# Patient Record
Sex: MALE | Race: WHITE | NOT HISPANIC OR LATINO
[De-identification: names, ages, dates, MRNs, and addresses within clinical notes are randomized per-mention and may not be internally consistent; named-entity substitution may affect disease eponyms.]

---

## 2018-08-21 ENCOUNTER — APPOINTMENT (OUTPATIENT)
Dept: HEART AND VASCULAR | Facility: CLINIC | Age: 68
End: 2018-08-21
Payer: MEDICARE

## 2018-08-21 VITALS
RESPIRATION RATE: 15 BRPM | DIASTOLIC BLOOD PRESSURE: 70 MMHG | HEART RATE: 60 BPM | BODY MASS INDEX: 25.16 KG/M2 | SYSTOLIC BLOOD PRESSURE: 114 MMHG | WEIGHT: 166 LBS | HEIGHT: 68 IN

## 2018-08-21 DIAGNOSIS — Z86.010 PERSONAL HISTORY OF COLONIC POLYPS: ICD-10-CM

## 2018-08-21 DIAGNOSIS — Z87.11 PERSONAL HISTORY OF PEPTIC ULCER DISEASE: ICD-10-CM

## 2018-08-21 DIAGNOSIS — Z78.9 OTHER SPECIFIED HEALTH STATUS: ICD-10-CM

## 2018-08-21 DIAGNOSIS — F17.200 NICOTINE DEPENDENCE, UNSPECIFIED, UNCOMPLICATED: ICD-10-CM

## 2018-08-21 DIAGNOSIS — Z00.00 ENCOUNTER FOR GENERAL ADULT MEDICAL EXAMINATION W/OUT ABNORMAL FINDINGS: ICD-10-CM

## 2018-08-21 DIAGNOSIS — E78.5 HYPERLIPIDEMIA, UNSPECIFIED: ICD-10-CM

## 2018-08-21 DIAGNOSIS — Z87.19 PERSONAL HISTORY OF OTHER DISEASES OF THE DIGESTIVE SYSTEM: ICD-10-CM

## 2018-08-21 DIAGNOSIS — Z82.49 FAMILY HISTORY OF ISCHEMIC HEART DISEASE AND OTHER DISEASES OF THE CIRCULATORY SYSTEM: ICD-10-CM

## 2018-08-21 DIAGNOSIS — J44.9 CHRONIC OBSTRUCTIVE PULMONARY DISEASE, UNSPECIFIED: ICD-10-CM

## 2018-08-21 DIAGNOSIS — E55.9 VITAMIN D DEFICIENCY, UNSPECIFIED: ICD-10-CM

## 2018-08-21 DIAGNOSIS — I70.8 ATHEROSCLEROSIS OF OTHER ARTERIES: ICD-10-CM

## 2018-08-21 DIAGNOSIS — I34.0 NONRHEUMATIC MITRAL (VALVE) INSUFFICIENCY: ICD-10-CM

## 2018-08-21 DIAGNOSIS — Z95.1 PRESENCE OF AORTOCORONARY BYPASS GRAFT: ICD-10-CM

## 2018-08-21 DIAGNOSIS — E53.8 DEFICIENCY OF OTHER SPECIFIED B GROUP VITAMINS: ICD-10-CM

## 2018-08-21 DIAGNOSIS — I25.10 ATHEROSCLEROTIC HEART DISEASE OF NATIVE CORONARY ARTERY W/OUT ANGINA PECTORIS: ICD-10-CM

## 2018-08-21 PROCEDURE — 99214 OFFICE O/P EST MOD 30 MIN: CPT | Mod: 25

## 2018-08-21 PROCEDURE — 93000 ELECTROCARDIOGRAM COMPLETE: CPT

## 2018-08-21 PROCEDURE — 36415 COLL VENOUS BLD VENIPUNCTURE: CPT

## 2018-08-21 RX ORDER — ASPIRIN 81 MG
81 TABLET, DELAYED RELEASE (ENTERIC COATED) ORAL
Refills: 0 | Status: ACTIVE | COMMUNITY

## 2018-08-21 RX ORDER — DEXLANSOPRAZOLE 60 MG/1
60 CAPSULE, DELAYED RELEASE ORAL
Refills: 0 | Status: ACTIVE | COMMUNITY

## 2018-08-21 RX ORDER — COLD-HOT PACK
125 MCG EACH MISCELLANEOUS
Refills: 0 | Status: ACTIVE | COMMUNITY

## 2018-08-21 RX ORDER — CYANOCOBALAMIN (VITAMIN B-12) 1000 MCG
1000 TABLET ORAL
Refills: 0 | Status: ACTIVE | COMMUNITY

## 2018-08-21 RX ORDER — PROPRANOLOL HYDROCHLORIDE 160 MG/1
160 CAPSULE, EXTENDED RELEASE ORAL
Refills: 0 | Status: ACTIVE | COMMUNITY

## 2018-08-21 RX ORDER — PRIMIDONE 50 MG/1
50 TABLET ORAL
Refills: 0 | Status: ACTIVE | COMMUNITY

## 2018-08-21 RX ORDER — TAMSULOSIN HYDROCHLORIDE 0.4 MG/1
0.4 CAPSULE ORAL
Refills: 0 | Status: ACTIVE | COMMUNITY

## 2018-08-22 LAB
ALBUMIN SERPL ELPH-MCNC: 4.8 G/DL
ALP BLD-CCNC: 108 U/L
ALT SERPL-CCNC: 15 U/L
ANION GAP SERPL CALC-SCNC: 15 MMOL/L
AST SERPL-CCNC: 21 U/L
BASOPHILS # BLD AUTO: 0.05 K/UL
BASOPHILS NFR BLD AUTO: 0.5 %
BILIRUB SERPL-MCNC: 0.2 MG/DL
BUN SERPL-MCNC: 10 MG/DL
CALCIUM SERPL-MCNC: 10.1 MG/DL
CHLORIDE SERPL-SCNC: 100 MMOL/L
CHOLEST SERPL-MCNC: 167 MG/DL
CHOLEST/HDLC SERPL: 4.5 RATIO
CO2 SERPL-SCNC: 27 MMOL/L
CREAT SERPL-MCNC: 1.1 MG/DL
EOSINOPHIL # BLD AUTO: 0.31 K/UL
EOSINOPHIL NFR BLD AUTO: 2.8 %
GLUCOSE SERPL-MCNC: 85 MG/DL
HBA1C MFR BLD HPLC: 6 %
HCT VFR BLD CALC: 44.5 %
HDLC SERPL-MCNC: 37 MG/DL
HGB BLD-MCNC: 14.3 G/DL
IMM GRANULOCYTES NFR BLD AUTO: 0.2 %
LDLC SERPL CALC-MCNC: 90 MG/DL
LYMPHOCYTES # BLD AUTO: 2.56 K/UL
LYMPHOCYTES NFR BLD AUTO: 23.5 %
MAN DIFF?: NORMAL
MCHC RBC-ENTMCNC: 32.1 GM/DL
MCHC RBC-ENTMCNC: 33 PG
MCV RBC AUTO: 102.8 FL
MONOCYTES # BLD AUTO: 0.7 K/UL
MONOCYTES NFR BLD AUTO: 6.4 %
NEUTROPHILS # BLD AUTO: 7.27 K/UL
NEUTROPHILS NFR BLD AUTO: 66.6 %
PLATELET # BLD AUTO: 301 K/UL
POTASSIUM SERPL-SCNC: 4.9 MMOL/L
PROT SERPL-MCNC: 7 G/DL
RBC # BLD: 4.33 M/UL
RBC # FLD: 14.8 %
SODIUM SERPL-SCNC: 142 MMOL/L
T3FREE SERPL-MCNC: 4.25 PG/ML
T4 FREE SERPL-MCNC: 1.3 NG/DL
T4 SERPL-MCNC: 8.4 UG/DL
TRIGL SERPL-MCNC: 199 MG/DL
TSH SERPL-ACNC: 2.4 UIU/ML
WBC # FLD AUTO: 10.91 K/UL

## 2018-12-26 ENCOUNTER — APPOINTMENT (OUTPATIENT)
Dept: HEART AND VASCULAR | Facility: CLINIC | Age: 68
End: 2018-12-26

## 2023-06-20 ENCOUNTER — NON-APPOINTMENT (OUTPATIENT)
Age: 73
End: 2023-06-20

## 2023-06-20 ENCOUNTER — APPOINTMENT (OUTPATIENT)
Dept: NEUROSURGERY | Facility: CLINIC | Age: 73
End: 2023-06-20
Payer: MEDICARE

## 2023-06-20 VITALS
HEIGHT: 68 IN | SYSTOLIC BLOOD PRESSURE: 121 MMHG | BODY MASS INDEX: 25.76 KG/M2 | WEIGHT: 170 LBS | DIASTOLIC BLOOD PRESSURE: 68 MMHG | HEART RATE: 48 BPM | OXYGEN SATURATION: 97 %

## 2023-06-20 DIAGNOSIS — G25.0 ESSENTIAL TREMOR: ICD-10-CM

## 2023-06-20 PROCEDURE — 99205 OFFICE O/P NEW HI 60 MIN: CPT

## 2023-06-20 RX ORDER — ATORVASTATIN CALCIUM 80 MG/1
80 TABLET, FILM COATED ORAL
Refills: 0 | Status: ACTIVE | COMMUNITY

## 2023-06-20 RX ORDER — APIXABAN 5 MG/1
5 TABLET, FILM COATED ORAL
Refills: 0 | Status: ACTIVE | COMMUNITY

## 2023-06-20 RX ORDER — BUPROPION HYDROCHLORIDE 100 MG/1
100 TABLET, FILM COATED ORAL
Refills: 0 | Status: ACTIVE | COMMUNITY

## 2023-06-20 RX ORDER — TERAZOSIN HCL 2 MG
2 TABLET ORAL
Refills: 0 | Status: ACTIVE | COMMUNITY

## 2023-06-20 RX ORDER — CLOPIDOGREL BISULFATE 75 MG/1
75 TABLET, FILM COATED ORAL
Refills: 0 | Status: ACTIVE | COMMUNITY

## 2023-06-20 RX ORDER — EZETIMIBE 10 MG/1
10 TABLET ORAL
Refills: 0 | Status: ACTIVE | COMMUNITY

## 2023-06-20 RX ORDER — CHROMIUM 200 MCG
TABLET ORAL
Refills: 0 | Status: ACTIVE | COMMUNITY

## 2023-06-23 NOTE — HISTORY OF PRESENT ILLNESS
[> 3 months] : more  than 3 months [FreeTextEntry1] : tremor [de-identified] : MANDY EASTMAN is a 72 year old right handed male with PMH of essential tremor, HTN, gastritis, peptic ulcer, CAD s/p triple bypass 2005 with possible? 1 stent, right carotid stenosis, atrial fibrillation (on Eliquis), Raynaud's disease. He also takes Plavix. He presents for neurosurgical consult for high intensity focused ultrasound. He does not have a neurologist currently. He was diagnosed with essential tremor ~10 years ago. Tremor started before then in right hand. Now he notices a little tremor in left hand, and head tremor that started about 1 year ago. No jaw or voice tremor. He was started on Primidone when diagnosed and it didn't help much, but he still takes 150 mg BID. He also takes propranolol 80 mg BID currently. Tremor improves with alcohol. He has family history of tremor in mother, brother, and 2 children. Difficulty with ADLs like writing, eating (which didn't improve with weighted utensils), drinking, with buttons.  States balance is good, denies falls.

## 2023-06-23 NOTE — ASSESSMENT
[FreeTextEntry1] : IMPRESSION:\par 72M with PMH of severe ET, HTN, GERD, gastritis, peptic ulcer, CAD s/p triple bypass 2005 with ?1 stent, right carotid stenosis, a-fib, Reynaud's disease. He is on Eliquis and Plavix. He presents for HIFU consult. He was dx with ET 10 years ago. Had right hand tremor which has progressed to left hand and head. He is on primidone and propranolol. It is affecting ability to perform ADLs like writing, eating, drinking. \par \par Pt not interested in deep brain stimulation. Discussed MRI guided focused ultrasound therapy including the procedure, risks, and benefits in office today. \par \par \par During MRI guided focused ultrasound, a lesion is made in brain that is pathologically related to cause of tremor. If pathological track is targeted by creating lesion/burn in area along white matter tract, it will also treat tremor. Ventral intermediate nucleus of the thalamus is area that is targeted during procedure.\par \par High intensity focused ultrasound is a procedure that occurs in an MRI magnet, where a series of MRIs are performed and the target is identified and ultrasound therapy is targeted at identified area. The head is completely shaved prior to a frame being placed. Then a ‘rubber bag’ of circulating cool water is fitted to the head to prevent thermal injury to the scalp, and improve the interface between the transducer and the scalp. Subthreshold sonications are delivered so benefit and side effect can be assessed prior to creating a permanent lesion. Many series of assessments will be performed during the procedure. Lesions are irreversible. FDA approved for unilateral treatment. If this option chosen, left VIM thalamus would be targeted to treat right hand tremor. It will not improve head tremor as b/l treatment needed, or left hand tremor. The other side can be treated 9-12 months later if the patient desires. This is procedure, not a surgery, not performed under anesthesia.\par \par Side effects are possible including tingling around face on one side or around lips or hand. That may persist up to several months after procedure, and can persist. May be faint to severe. May attenuate over a year following procedure. Weakness possible in addition to paraesthesias. Side effect may sophia over time. Very common to have unsteady gait after procedure. You will be discharged with a walker as a fall precaution. Efficacy of tremor suppression wanes after 1 year and attenuates over time. May need another procedure in the future.\par \par \par PLAN:\par Referral to movement disorder neurologist for tremor grading. \par CT head HIFU protocol to be done at [facility] to determine skull density ratio (SDR)\par Asymptomatic bradycardia- on propranolol BID, has appointment with cardiology next week\par *must find out if stent is 3T MRI compatible\par Would need to stop Eliquis and Plavix prior to procedure\par \par \par I, Dr. Ameya Saldana, personally performed the evaluation and management (E/M) services for this new patient.  That E/M includes conducting the clinically appropriate initial history &/or exam, assessing all conditions, and establishing the plan of care.  Today, my ANKIT, [employer], was here to observe my evaluation and management service for this patient & follow plan of care established by me going forward.\par

## 2023-06-23 NOTE — PHYSICAL EXAM
[General Appearance - Alert] : alert [General Appearance - In No Acute Distress] : in no acute distress [General Appearance - Well Nourished] : well nourished [General Appearance - Well-Appearing] : healthy appearing [Oriented To Time, Place, And Person] : oriented to person, place, and time [Affect] : the affect was normal [Mood] : the mood was normal [Person] : oriented to person [Place] : oriented to place [Time] : oriented to time [Cranial Nerves Oculomotor (III)] : extraocular motion intact [Cranial Nerves Trigeminal (V)] : facial sensation intact symmetrically [Cranial Nerves Facial (VII)] : face symmetrical [Cranial Nerves Vestibulocochlear (VIII)] : hearing was intact bilaterally [Cranial Nerves Accessory (XI - Cranial And Spinal)] : head turning and shoulder shrug symmetric [Cranial Nerves Hypoglossal (XII)] : there was no tongue deviation with protrusion [Motor Strength] : muscle strength was normal in all four extremities [Motor Handedness Right-Handed] : the patient is right hand dominant [Sensation Tactile Decrease] : light touch was intact [Tremor] : a tremor present [] : no respiratory distress [Respiration, Rhythm And Depth] : normal respiratory rhythm and effort [FreeTextEntry8] : mild right rest tremor, postural with arms extended: 1 cm b/l, winged: 1 cm b/l, finger to nose: 3-5 cm b/l, tremor severe with spiral, tremor exacerbated holding mug and bringing to mouth b/l, unable to tandem gait or stance, able to stand with feet together

## 2023-07-06 ENCOUNTER — APPOINTMENT (OUTPATIENT)
Dept: NEUROLOGY | Facility: CLINIC | Age: 73
End: 2023-07-06

## 2024-03-29 ENCOUNTER — OUTPATIENT (OUTPATIENT)
Dept: OUTPATIENT SERVICES | Facility: HOSPITAL | Age: 74
LOS: 1 days | End: 2024-03-29
Payer: MEDICARE

## 2024-03-29 ENCOUNTER — APPOINTMENT (OUTPATIENT)
Dept: CT IMAGING | Facility: HOSPITAL | Age: 74
End: 2024-03-29

## 2024-03-29 DIAGNOSIS — G25.0 ESSENTIAL TREMOR: ICD-10-CM

## 2024-03-29 PROCEDURE — 70450 CT HEAD/BRAIN W/O DYE: CPT | Mod: 26

## 2024-03-29 PROCEDURE — 70450 CT HEAD/BRAIN W/O DYE: CPT

## 2024-03-31 ENCOUNTER — NON-APPOINTMENT (OUTPATIENT)
Age: 74
End: 2024-03-31

## 2024-04-03 ENCOUNTER — NON-APPOINTMENT (OUTPATIENT)
Age: 74
End: 2024-04-03

## 2024-04-05 ENCOUNTER — NON-APPOINTMENT (OUTPATIENT)
Age: 74
End: 2024-04-05

## 2024-05-30 ENCOUNTER — APPOINTMENT (OUTPATIENT)
Dept: NEUROLOGY | Facility: CLINIC | Age: 74
End: 2024-05-30
Payer: MEDICARE

## 2024-05-30 VITALS
SYSTOLIC BLOOD PRESSURE: 129 MMHG | WEIGHT: 170 LBS | HEIGHT: 68 IN | BODY MASS INDEX: 25.76 KG/M2 | DIASTOLIC BLOOD PRESSURE: 71 MMHG | HEART RATE: 70 BPM

## 2024-05-30 PROCEDURE — 99205 OFFICE O/P NEW HI 60 MIN: CPT

## 2024-05-31 ENCOUNTER — RESULT REVIEW (OUTPATIENT)
Age: 74
End: 2024-05-31

## 2024-05-31 NOTE — HISTORY OF PRESENT ILLNESS
[FreeTextEntry1] : 73-year-old male with reported history of ET with hand tremors. He was last seen by a neurologist 3 years ago. Patient has been interested in HIFU for the last 6 years. Patient is electing to undergo HIFU to treat his RUE.

## 2024-05-31 NOTE — PHYSICAL EXAM
[General Appearance - Alert] : alert [Oriented To Time, Place, And Person] : oriented to person, place, and time [Cranial Nerves Oculomotor (III)] : extraocular motion intact [FreeTextEntry1] : 0 facial masking Vertical eye movements intact without square wave jerks normal speech 0 Rigidity of neck rotation 0 Rigidity of limbs present with contralateral activation  +1 RUE Resting tremor Moderate right greater than left action tremor Moderate right greater than left postural tremor  0 Bradykinesia with finger tapping, hand supination/pronation, foot tapping, foot stomping. No dysmetria with finger to nose Gait: able to stand with hands crossed and without assistance posture WNL Gait WNL. Unable to tandem.  Negative retropulsion test Handwriting illegible due to tremor severity. Spirals have severe waveforms   ANNE 1,2 & 3 score: 3 Tetras ADL subscale: 34/48 Tetras performance subscale: 35/64

## 2024-05-31 NOTE — DISCUSSION/SUMMARY
[FreeTextEntry1] : 73-year-old male with reported history of ET with hand tremors completed tremor assessment. He is electing to undergo HIFU to treat his RUE since his tremors interfere with many activities of daily living. Procedure is scheduled for June 20th 2024.  Patient was counseled on the following recommendations Return 3 months post procedure to complete reassessment.

## 2024-06-06 ENCOUNTER — APPOINTMENT (OUTPATIENT)
Dept: MRI IMAGING | Facility: HOSPITAL | Age: 74
End: 2024-06-06

## 2024-06-06 ENCOUNTER — OUTPATIENT (OUTPATIENT)
Dept: OUTPATIENT SERVICES | Facility: HOSPITAL | Age: 74
LOS: 1 days | End: 2024-06-06
Payer: MEDICARE

## 2024-06-06 DIAGNOSIS — G25.0 ESSENTIAL TREMOR: ICD-10-CM

## 2024-06-06 DIAGNOSIS — Z00.00 ENCOUNTER FOR GENERAL ADULT MEDICAL EXAMINATION WITHOUT ABNORMAL FINDINGS: ICD-10-CM

## 2024-06-06 PROCEDURE — 70551 MRI BRAIN STEM W/O DYE: CPT

## 2024-06-06 PROCEDURE — 70551 MRI BRAIN STEM W/O DYE: CPT | Mod: 26

## 2024-06-09 ENCOUNTER — NON-APPOINTMENT (OUTPATIENT)
Age: 74
End: 2024-06-09

## 2024-06-13 ENCOUNTER — NON-APPOINTMENT (OUTPATIENT)
Age: 74
End: 2024-06-13

## 2024-06-18 ENCOUNTER — NON-APPOINTMENT (OUTPATIENT)
Age: 74
End: 2024-06-18

## 2024-06-19 ENCOUNTER — TRANSCRIPTION ENCOUNTER (OUTPATIENT)
Age: 74
End: 2024-06-19

## 2024-06-20 ENCOUNTER — APPOINTMENT (OUTPATIENT)
Dept: MRI IMAGING | Facility: HOSPITAL | Age: 74
End: 2024-06-20

## 2024-06-20 ENCOUNTER — TRANSCRIPTION ENCOUNTER (OUTPATIENT)
Age: 74
End: 2024-06-20

## 2024-06-20 ENCOUNTER — APPOINTMENT (OUTPATIENT)
Dept: NEUROSURGERY | Facility: HOSPITAL | Age: 74
End: 2024-06-20

## 2024-06-20 ENCOUNTER — OUTPATIENT (OUTPATIENT)
Dept: OUTPATIENT SERVICES | Facility: HOSPITAL | Age: 74
LOS: 1 days | End: 2024-06-20
Payer: MEDICARE

## 2024-06-20 VITALS
RESPIRATION RATE: 18 BRPM | OXYGEN SATURATION: 95 % | SYSTOLIC BLOOD PRESSURE: 168 MMHG | DIASTOLIC BLOOD PRESSURE: 91 MMHG | HEART RATE: 61 BPM

## 2024-06-20 VITALS — HEIGHT: 68 IN | WEIGHT: 173.06 LBS

## 2024-06-20 DIAGNOSIS — Z00.00 ENCOUNTER FOR GENERAL ADULT MEDICAL EXAMINATION WITHOUT ABNORMAL FINDINGS: ICD-10-CM

## 2024-06-20 PROCEDURE — 0398T: CPT | Mod: 26

## 2024-06-20 PROCEDURE — 61715 MRGFUS STRTCTC ABLT TRGT ICR: CPT

## 2024-06-20 RX ORDER — ONDANSETRON HYDROCHLORIDE 2 MG/ML
8 INJECTION INTRAMUSCULAR; INTRAVENOUS ONCE
Refills: 0 | Status: COMPLETED | OUTPATIENT
Start: 2024-06-20 | End: 2024-06-20

## 2024-06-20 RX ORDER — PANTOPRAZOLE 40 MG/1
40 TABLET, DELAYED RELEASE ORAL
Qty: 7 | Refills: 0 | Status: ACTIVE | COMMUNITY
Start: 2024-06-20 | End: 1900-01-01

## 2024-06-20 RX ORDER — DEXAMETHASONE 2 MG/1
2 TABLET ORAL
Qty: 30 | Refills: 0 | Status: ACTIVE | COMMUNITY
Start: 2024-06-20 | End: 1900-01-01

## 2024-06-20 RX ORDER — ONDANSETRON 8 MG/1
8 TABLET, FILM COATED ORAL ONCE
Refills: 0 | Status: DISCONTINUED | OUTPATIENT
Start: 2024-06-20 | End: 2024-06-20

## 2024-06-20 RX ORDER — ACETAMINOPHEN 500 MG
1000 TABLET ORAL ONCE
Refills: 0 | Status: DISCONTINUED | OUTPATIENT
Start: 2024-06-20 | End: 2024-06-20

## 2024-06-20 RX ORDER — DEXAMETHASONE 0.5 MG/5ML
10 ELIXIR ORAL ONCE
Refills: 0 | Status: DISCONTINUED | OUTPATIENT
Start: 2024-06-20 | End: 2024-06-20

## 2024-06-20 RX ORDER — DEXAMETHASONE 1 MG/1
10 TABLET ORAL ONCE
Refills: 0 | Status: COMPLETED | OUTPATIENT
Start: 2024-06-20 | End: 2024-06-20

## 2024-06-20 RX ORDER — ACETAMINOPHEN 325 MG
1000 TABLET ORAL ONCE
Refills: 0 | Status: COMPLETED | OUTPATIENT
Start: 2024-06-20 | End: 2024-06-20

## 2024-06-20 RX ADMIN — ONDANSETRON HYDROCHLORIDE 8 MILLIGRAM(S): 2 INJECTION INTRAMUSCULAR; INTRAVENOUS at 08:00

## 2024-06-20 RX ADMIN — DEXAMETHASONE 10 MILLIGRAM(S): 1 TABLET ORAL at 08:00

## 2024-06-20 RX ADMIN — Medication 400 MILLIGRAM(S): at 08:00

## 2024-06-20 NOTE — H&P ADULT - ASSESSMENT
72M with PMH of severe ET, HTN, GERD, gastritis, peptic ulcer, CAD s/p triple bypass 2005 with ?1 stent, right carotid stenosis, a-fib, Reynaud's disease. He is on Eliquis and Plavix. He presents for HIFU consult. He was dx with ET 10 years ago. Had right hand tremor which has progressed to left hand and head. He is on primidone and propranolol. It is affecting ability to perform ADLs like writing, eating, drinking. Pt not interested in deep brain stimulation. Discussed MRI guided focused ultrasound therapy including the procedure, risks, and benefits in office. Presents today with HIFU  -Zofran, IV tyl, Dex 10, and d/c home on dex taper  -HIFU today   -Post HIFU MRI

## 2024-06-20 NOTE — H&P ADULT - HISTORY OF PRESENT ILLNESS
72M with PMH of severe ET, HTN, GERD, gastritis, peptic ulcer, CAD s/p triple bypass 2005 with ?1 stent, right carotid stenosis, a-fib, Reynaud's disease. He is on Eliquis and Plavix. He presents for HIFU consult. He was dx with ET 10 years ago. Had right hand tremor which has progressed to left hand and head. He is on primidone and propranolol. It is affecting ability to perform ADLs like writing, eating, drinking.   Pt not interested in deep brain stimulation. Discussed MRI guided focused ultrasound therapy including the procedure, risks, and benefits in office today.

## 2024-07-23 ENCOUNTER — APPOINTMENT (OUTPATIENT)
Dept: NEUROSURGERY | Facility: CLINIC | Age: 74
End: 2024-07-23
Payer: MEDICARE

## 2024-07-23 VITALS
OXYGEN SATURATION: 94 % | DIASTOLIC BLOOD PRESSURE: 59 MMHG | SYSTOLIC BLOOD PRESSURE: 107 MMHG | BODY MASS INDEX: 25.76 KG/M2 | HEART RATE: 64 BPM | HEIGHT: 68 IN | WEIGHT: 170 LBS

## 2024-07-23 DIAGNOSIS — G25.0 ESSENTIAL TREMOR: ICD-10-CM

## 2024-07-23 PROCEDURE — 99214 OFFICE O/P EST MOD 30 MIN: CPT

## 2024-07-28 NOTE — ASSESSMENT
[FreeTextEntry1] : IMPRESSION: 73M with PMH of severe ET, HTN, GERD, gastritis, peptic ulcer, CAD s/p triple bypass 2005 with ?1 stent, right carotid stenosis, a-fib, Reynaud's disease. He is on Eliquis and Plavix. He presents for HIFU consult. He was dx with ET 10 years ago. Had right hand tremor which has progressed to left hand and head. He is on primidone and propranolol. It is affecting ability to perform ADLs like writing, eating, drinking. He is s/p HIFU left VIM thalamus 6/20/24.    He is here for 3 month follow up. Significant improvement in right hand tremor. Had post procedure side effects of gait instability, slurred speech, numbness at tip of tongue. Side effects should improve as swelling resolves. Gait has improved greatly, and slurred speech has improved a little. It is intermittent.  Will continue to monitor memory complaint at 3 month follow up. May not be r/t procedure.   He can discuss tremor  medication with neurology at follow up.   PLAN: -3 months post procedure MRI at [facility] -3 month post procedure repeat tremor grading with neurology -Return to office 2 months

## 2024-07-28 NOTE — HISTORY OF PRESENT ILLNESS
[> 3 months] : more  than 3 months [FreeTextEntry1] : tremor [de-identified] : MANDY EASTMAN is a 73 year old right handed male with PMH of essential tremor, HTN, gastritis, peptic ulcer, CAD s/p triple bypass 2005 with possible? 1 stent, right carotid stenosis, atrial fibrillation (on Eliquis), Raynaud's disease. He also takes Plavix. He presents for neurosurgical consult for high intensity focused ultrasound. He does not have a neurologist currently. He was diagnosed with essential tremor ~10 years ago. Tremor started before then in right hand. Now he notices a little tremor in left hand, and head tremor that started about 1 year ago. No jaw or voice tremor. He was started on Primidone when diagnosed and it didn't help much, but he still takes 150 mg BID. He also takes propranolol 80 mg BID currently. Tremor improves with alcohol. He has family history of tremor in mother, brother, and 2 children. Difficulty with ADLs like writing, eating (which didn't improve with weighted utensils), drinking, with buttons.  States balance is good, denies falls. He underwent HIFU left VIM thalamus for right hand tremor 6/20/24.  Today he presents for 1 month follow up. He has significant right hand tremor improvement with persistent left postural/action tremor.   His gait has significantly improved since immediately post procedure although he is still unsteady at times. He denies numbness/tingling in hands or fingertips or around his mouth. He has some numbness in the tip of his tongue. He has slurred speech which is better since immediately post procedure but it is intermittent. His family reports he is more forgetful, not remembering things they have talked about.

## 2024-07-28 NOTE — ASSESSMENT
[FreeTextEntry1] : IMPRESSION: 73M with PMH of severe ET, HTN, GERD, gastritis, peptic ulcer, CAD s/p triple bypass 2005 with ?1 stent, right carotid stenosis, a-fib, Reynaud's disease. He is on Eliquis and Plavix. He presents for HIFU consult. He was dx with ET 10 years ago. Had right hand tremor which has progressed to left hand and head. He is on primidone and propranolol. It is affecting ability to perform ADLs like writing, eating, drinking. He is s/p HIFU left VIM thalamus 6/20/24.    He is here for 3 month follow up. Significant improvement in right hand tremor. Had post procedure side effects of gait instability, slurred speech, numbness at tip of tongue. Side effects should improve as swelling resolves. Gait has improved greatly, and slurred speech has improved a little. It is intermittent.  Will continue to monitor memory complaint at 3 month follow up. May not be r/t procedure.   He can discuss tremor  medication with neurology at follow up.   PLAN: -3 months post procedure MRI at [facility] -3 month post procedure repeat tremor grading with neurology -Return to office 2 months

## 2024-07-28 NOTE — ASSESSMENT
[FreeTextEntry1] : IMPRESSION: 73M with PMH of severe ET, HTN, GERD, gastritis, peptic ulcer, CAD s/p triple bypass 2005 with ?1 stent, right carotid stenosis, a-fib, Reynaud's disease. He is on Eliquis and Plavix. He presents for HIFU consult. He was dx with ET 10 years ago. Had right hand tremor which has progressed to left hand and head. He is on primidone and propranolol. It is affecting ability to perform ADLs like writing, eating, drinking. He is s/p HIFU left VIM thalamus 6/20/24.    He is here for 3 month follow up. Significant improvement in right hand tremor. Had post procedure side effects of gait instability, slurred speech, numbness at tip of tongue. Side effects should improve as swelling resolves. Gait has improved greatly, and slurred speech has improved a little. It is intermittent.  Will continue to monitor memory complaint at 3 month follow up. May not be r/t procedure.   He can discuss tremor  medication with neurology at follow up.   PLAN: -3 months post procedure MRI at [facility] -3 month post procedure repeat tremor grading with neurology -Return to office 2 months

## 2024-07-28 NOTE — ASSESSMENT
[FreeTextEntry1] : IMPRESSION: 73M with PMH of severe ET, HTN, GERD, gastritis, peptic ulcer, CAD s/p triple bypass 2005 with ?1 stent, right carotid stenosis, a-fib, Reynaud's disease. He is on Eliquis and Plavix. He presents for HIFU consult. He was dx with ET 10 years ago. Had right hand tremor which has progressed to left hand and head. He is on primidone and propranolol. It is affecting ability to perform ADLs like writing, eating, drinking. He is s/p HIFU left VIM thalamus 6/20/24.    He is here for 3 month follow up. Significant improvement in right hand tremor. Had post procedure side effects of gait instability, slurred speech, numbness at tip of tongue. Side effects should improve as swelling resolves. Gait has improved greatly, and slurred speech has improved a little. It is intermittent.  Will continue to monitor memory complaint at 3 month follow up. May not be r/t procedure.   He can discuss tremor  medication with neurology at follow up.   PLAN: -3 months post procedure MRI at [facility] -3 month post procedure repeat tremor grading with neurology -Return to office 2 months

## 2024-07-28 NOTE — HISTORY OF PRESENT ILLNESS
[> 3 months] : more  than 3 months [FreeTextEntry1] : tremor [de-identified] : MANDY EASTMAN is a 73 year old right handed male with PMH of essential tremor, HTN, gastritis, peptic ulcer, CAD s/p triple bypass 2005 with possible? 1 stent, right carotid stenosis, atrial fibrillation (on Eliquis), Raynaud's disease. He also takes Plavix. He presents for neurosurgical consult for high intensity focused ultrasound. He does not have a neurologist currently. He was diagnosed with essential tremor ~10 years ago. Tremor started before then in right hand. Now he notices a little tremor in left hand, and head tremor that started about 1 year ago. No jaw or voice tremor. He was started on Primidone when diagnosed and it didn't help much, but he still takes 150 mg BID. He also takes propranolol 80 mg BID currently. Tremor improves with alcohol. He has family history of tremor in mother, brother, and 2 children. Difficulty with ADLs like writing, eating (which didn't improve with weighted utensils), drinking, with buttons.  States balance is good, denies falls. He underwent HIFU left VIM thalamus for right hand tremor 6/20/24.  Today he presents for 1 month follow up. He has significant right hand tremor improvement with persistent left postural/action tremor.   His gait has significantly improved since immediately post procedure although he is still unsteady at times. He denies numbness/tingling in hands or fingertips or around his mouth. He has some numbness in the tip of his tongue. He has slurred speech which is better since immediately post procedure but it is intermittent. His family reports he is more forgetful, not remembering things they have talked about.

## 2024-07-28 NOTE — HISTORY OF PRESENT ILLNESS
[> 3 months] : more  than 3 months [FreeTextEntry1] : tremor [de-identified] : MANDY EASTMAN is a 73 year old right handed male with PMH of essential tremor, HTN, gastritis, peptic ulcer, CAD s/p triple bypass 2005 with possible? 1 stent, right carotid stenosis, atrial fibrillation (on Eliquis), Raynaud's disease. He also takes Plavix. He presents for neurosurgical consult for high intensity focused ultrasound. He does not have a neurologist currently. He was diagnosed with essential tremor ~10 years ago. Tremor started before then in right hand. Now he notices a little tremor in left hand, and head tremor that started about 1 year ago. No jaw or voice tremor. He was started on Primidone when diagnosed and it didn't help much, but he still takes 150 mg BID. He also takes propranolol 80 mg BID currently. Tremor improves with alcohol. He has family history of tremor in mother, brother, and 2 children. Difficulty with ADLs like writing, eating (which didn't improve with weighted utensils), drinking, with buttons.  States balance is good, denies falls. He underwent HIFU left VIM thalamus for right hand tremor 6/20/24.  Today he presents for 1 month follow up. He has significant right hand tremor improvement with persistent left postural/action tremor.   His gait has significantly improved since immediately post procedure although he is still unsteady at times. He denies numbness/tingling in hands or fingertips or around his mouth. He has some numbness in the tip of his tongue. He has slurred speech which is better since immediately post procedure but it is intermittent. His family reports he is more forgetful, not remembering things they have talked about.

## 2024-07-28 NOTE — HISTORY OF PRESENT ILLNESS
[> 3 months] : more  than 3 months [FreeTextEntry1] : tremor [de-identified] : MANDY EASTMAN is a 73 year old right handed male with PMH of essential tremor, HTN, gastritis, peptic ulcer, CAD s/p triple bypass 2005 with possible? 1 stent, right carotid stenosis, atrial fibrillation (on Eliquis), Raynaud's disease. He also takes Plavix. He presents for neurosurgical consult for high intensity focused ultrasound. He does not have a neurologist currently. He was diagnosed with essential tremor ~10 years ago. Tremor started before then in right hand. Now he notices a little tremor in left hand, and head tremor that started about 1 year ago. No jaw or voice tremor. He was started on Primidone when diagnosed and it didn't help much, but he still takes 150 mg BID. He also takes propranolol 80 mg BID currently. Tremor improves with alcohol. He has family history of tremor in mother, brother, and 2 children. Difficulty with ADLs like writing, eating (which didn't improve with weighted utensils), drinking, with buttons.  States balance is good, denies falls. He underwent HIFU left VIM thalamus for right hand tremor 6/20/24.  Today he presents for 1 month follow up. He has significant right hand tremor improvement with persistent left postural/action tremor.   His gait has significantly improved since immediately post procedure although he is still unsteady at times. He denies numbness/tingling in hands or fingertips or around his mouth. He has some numbness in the tip of his tongue. He has slurred speech which is better since immediately post procedure but it is intermittent. His family reports he is more forgetful, not remembering things they have talked about.

## 2024-07-28 NOTE — PHYSICAL EXAM
[General Appearance - Alert] : alert [General Appearance - In No Acute Distress] : in no acute distress [General Appearance - Well Nourished] : well nourished [General Appearance - Well-Appearing] : healthy appearing [Oriented To Time, Place, And Person] : oriented to person, place, and time [Affect] : the affect was normal [Mood] : the mood was normal [Person] : oriented to person [Place] : oriented to place [Time] : oriented to time [Cranial Nerves Oculomotor (III)] : extraocular motion intact [Cranial Nerves Trigeminal (V)] : facial sensation intact symmetrically [Cranial Nerves Facial (VII)] : face symmetrical [Cranial Nerves Vestibulocochlear (VIII)] : hearing was intact bilaterally [Cranial Nerves Accessory (XI - Cranial And Spinal)] : head turning and shoulder shrug symmetric [Cranial Nerves Hypoglossal (XII)] : there was no tongue deviation with protrusion [Motor Strength] : muscle strength was normal in all four extremities [Motor Handedness Right-Handed] : the patient is right hand dominant [Sensation Tactile Decrease] : light touch was intact [] : no respiratory distress [Respiration, Rhythm And Depth] : normal respiratory rhythm and effort [FreeTextEntry1] : intermittent slurred speech

## 2024-09-30 ENCOUNTER — NON-APPOINTMENT (OUTPATIENT)
Age: 74
End: 2024-09-30

## 2024-10-01 ENCOUNTER — APPOINTMENT (OUTPATIENT)
Dept: NEUROLOGY | Facility: CLINIC | Age: 74
End: 2024-10-01
Payer: MEDICARE

## 2024-10-01 ENCOUNTER — OUTPATIENT (OUTPATIENT)
Dept: OUTPATIENT SERVICES | Facility: HOSPITAL | Age: 74
LOS: 1 days | End: 2024-10-01
Payer: MEDICARE

## 2024-10-01 ENCOUNTER — APPOINTMENT (OUTPATIENT)
Dept: MRI IMAGING | Facility: HOSPITAL | Age: 74
End: 2024-10-01

## 2024-10-01 ENCOUNTER — APPOINTMENT (OUTPATIENT)
Dept: NEUROSURGERY | Facility: CLINIC | Age: 74
End: 2024-10-01
Payer: MEDICARE

## 2024-10-01 VITALS
HEART RATE: 62 BPM | SYSTOLIC BLOOD PRESSURE: 157 MMHG | BODY MASS INDEX: 25.76 KG/M2 | DIASTOLIC BLOOD PRESSURE: 66 MMHG | HEIGHT: 68 IN | WEIGHT: 170 LBS | OXYGEN SATURATION: 95 %

## 2024-10-01 DIAGNOSIS — Z00.00 ENCOUNTER FOR GENERAL ADULT MEDICAL EXAMINATION WITHOUT ABNORMAL FINDINGS: ICD-10-CM

## 2024-10-01 DIAGNOSIS — G25.0 ESSENTIAL TREMOR: ICD-10-CM

## 2024-10-01 PROCEDURE — 70551 MRI BRAIN STEM W/O DYE: CPT | Mod: 26

## 2024-10-01 PROCEDURE — 70551 MRI BRAIN STEM W/O DYE: CPT

## 2024-10-01 PROCEDURE — 99214 OFFICE O/P EST MOD 30 MIN: CPT

## 2024-10-01 NOTE — DISCUSSION/SUMMARY
[FreeTextEntry1] : 73-year-old male with reported history of ET with hand tremors completed tremor reassessment.  Patient was counseled on the following recommendations establish care with neurologist once again. May wean off daytime dose of primidone by removing 1-50mg tab every week.

## 2024-10-01 NOTE — PHYSICAL EXAM
[General Appearance - Alert] : alert [General Appearance - In No Acute Distress] : in no acute distress [Oriented To Time, Place, And Person] : oriented to person, place, and time [Person] : oriented to person [Place] : oriented to place [Time] : oriented to time [Cranial Nerves Oculomotor (III)] : extraocular motion intact [Cranial Nerves Trigeminal (V)] : facial sensation intact symmetrically [Cranial Nerves Facial (VII)] : face symmetrical [Cranial Nerves Accessory (XI - Cranial And Spinal)] : head turning and shoulder shrug symmetric [Cranial Nerves Hypoglossal (XII)] : there was no tongue deviation with protrusion [Motor Strength] : muscle strength was normal in all four extremities [Sensation Tactile Decrease] : light touch was intact [] : no respiratory distress [Respiration, Rhythm And Depth] : normal respiratory rhythm and effort

## 2024-10-01 NOTE — HISTORY OF PRESENT ILLNESS
[> 3 months] : more  than 3 months [FreeTextEntry1] : tremor [de-identified] : MANDY EASTMAN is a 73 year old right handed male with PMH of essential tremor, HTN, gastritis, peptic ulcer, CAD s/p triple bypass 2005 with possible? 1 stent, right carotid stenosis, atrial fibrillation (on Eliquis), Raynaud's disease. He also takes Plavix. He presents for neurosurgical consult for high intensity focused ultrasound. He does not have a neurologist currently. He was diagnosed with essential tremor ~10 years ago. Tremor started before then in right hand. Now he notices a little tremor in left hand, and head tremor that started about 1 year ago. No jaw or voice tremor. He was started on Primidone when diagnosed and it didn't help much, but he still takes 150 mg BID. He also takes propranolol 80 mg BID currently. Tremor improves with alcohol. He has family history of tremor in mother, brother, and 2 children. Difficulty with ADLs like writing, eating (which didn't improve with weighted utensils), drinking, with buttons.  States balance is good, denies falls. He underwent HIFU left VIM thalamus for right hand tremor 6/20/24.  Today he presents for 3 month follow up. He has significant right hand tremor improvement. His speech is still slurred, and his tip of his tongue is still a numb. No numbness/tingling in hand or fingertips. He has intermittent dragging of right foot when walking and occasionally veers right. This is much better than immediately post procedure. His fogginess has resolved.

## 2024-10-01 NOTE — HISTORY OF PRESENT ILLNESS
[FreeTextEntry1] : 73-year-old male presents for tremor grading s/p Left VIM HIFU  off balance used walker for 2-4 weeks, which improved on its own reports right side leaning, occasionally drags his RLE slurred speech which is improving. Was difficult to understand him in the beginning   currently taking primidone and propranolol   Primidone 150mg BID

## 2024-10-01 NOTE — HISTORY OF PRESENT ILLNESS
[> 3 months] : more  than 3 months [FreeTextEntry1] : tremor [de-identified] : MANDY EASTMAN is a 73 year old right handed male with PMH of essential tremor, HTN, gastritis, peptic ulcer, CAD s/p triple bypass 2005 with possible? 1 stent, right carotid stenosis, atrial fibrillation (on Eliquis), Raynaud's disease. He also takes Plavix. He presents for neurosurgical consult for high intensity focused ultrasound. He does not have a neurologist currently. He was diagnosed with essential tremor ~10 years ago. Tremor started before then in right hand. Now he notices a little tremor in left hand, and head tremor that started about 1 year ago. No jaw or voice tremor. He was started on Primidone when diagnosed and it didn't help much, but he still takes 150 mg BID. He also takes propranolol 80 mg BID currently. Tremor improves with alcohol. He has family history of tremor in mother, brother, and 2 children. Difficulty with ADLs like writing, eating (which didn't improve with weighted utensils), drinking, with buttons.  States balance is good, denies falls. He underwent HIFU left VIM thalamus for right hand tremor 6/20/24.  Today he presents for 3 month follow up. He has significant right hand tremor improvement. His speech is still slurred, and his tip of his tongue is still a numb. No numbness/tingling in hand or fingertips. He has intermittent dragging of right foot when walking and occasionally veers right. This is much better than immediately post procedure. His fogginess has resolved.

## 2024-10-01 NOTE — ASSESSMENT
[FreeTextEntry1] : IMPRESSION: 73M with PMH of severe ET, HTN, GERD, gastritis, peptic ulcer, CAD s/p triple bypass 2005 with ?1 stent, right carotid stenosis, a-fib, Reynaud's disease. He is on Eliquis and Plavix. He presents for HIFU consult. He was dx with ET 10 years ago. Had right hand tremor which has progressed to left hand and head. He is on primidone and propranolol. It is affecting ability to perform ADLs like writing, eating, drinking. He is s/p HIFU left VIM thalamus 6/20/24.    He is here for 3 month follow up. Significant improvement in right hand tremor. Had post procedure side effects of gait instability, slurred speech, numbness at tip of tongue. Side effects should improve as swelling resolves. Gait has improved greatly.  Tip of tongue numbness, and slurred speech remain. It has slowly gotten a little better.  He had 3 month MRI today and sees neurology for repeat tremor grading today.  PLAN: -PT 2-3x/week x 8 weeks for gait training/strengthening -Return to office if needed or if he is interested in treating left hand tremor

## 2024-10-01 NOTE — PHYSICAL EXAM
[General Appearance - Alert] : alert [Oriented To Time, Place, And Person] : oriented to person, place, and time [Cranial Nerves Oculomotor (III)] : extraocular motion intact [FreeTextEntry1] : 0 facial masking Vertical eye movements intact without square wave jerks normal speech 0 Rigidity of neck rotation 0 Rigidity of limbs present with contralateral activation  0 Resting tremor mild LUE action tremor mild LUE postural tremor  0 Bradykinesia with finger tapping, hand supination/pronation, foot tapping, foot stomping. No dysmetria with finger to nose Gait: able to stand with hands crossed and without assistance posture stooped Gait WNL. Unable to tandem.  Negative retropulsion test Handwriting WNL Spirals have mild waveforms L>R   ANNE 1,2 & 3 score: 4 Tetras ADL subscale: 0/48 Tetras performance subscale: 15/64

## 2024-10-24 ENCOUNTER — APPOINTMENT (OUTPATIENT)
Dept: NEUROLOGY | Facility: CLINIC | Age: 74
End: 2024-10-24